# Patient Record
Sex: FEMALE | Race: WHITE | NOT HISPANIC OR LATINO | Employment: FULL TIME | URBAN - NONMETROPOLITAN AREA
[De-identification: names, ages, dates, MRNs, and addresses within clinical notes are randomized per-mention and may not be internally consistent; named-entity substitution may affect disease eponyms.]

---

## 2024-03-15 PROBLEM — Z01.818 PREOP EXAMINATION: Status: ACTIVE | Noted: 2024-03-15

## 2024-03-15 PROBLEM — I10 ESSENTIAL HYPERTENSION: Status: ACTIVE | Noted: 2024-03-15

## 2024-03-15 PROBLEM — M19.90 ARTHRITIS: Status: ACTIVE | Noted: 2024-03-15

## 2024-03-15 PROBLEM — R94.31 ABNORMAL EKG: Status: ACTIVE | Noted: 2024-03-15

## 2024-03-15 PROBLEM — G24.3 CERVICAL DYSTONIA: Status: ACTIVE | Noted: 2024-03-15

## 2024-03-15 PROBLEM — R07.89 CHEST DISCOMFORT: Status: ACTIVE | Noted: 2024-03-15

## 2024-03-15 RX ORDER — TIZANIDINE HYDROCHLORIDE 4 MG/1
4 CAPSULE, GELATIN COATED ORAL EVERY 8 HOURS PRN
COMMUNITY

## 2024-03-15 RX ORDER — LOSARTAN POTASSIUM 50 MG/1
50 TABLET ORAL DAILY
COMMUNITY

## 2024-03-15 RX ORDER — BACLOFEN 10 MG/1
10 TABLET ORAL DAILY
COMMUNITY

## 2024-08-27 ENCOUNTER — APPOINTMENT (OUTPATIENT)
Dept: CARDIOLOGY | Facility: CLINIC | Age: 54
End: 2024-08-27
Payer: COMMERCIAL

## 2024-08-27 VITALS
BODY MASS INDEX: 34.28 KG/M2 | HEIGHT: 60 IN | HEART RATE: 84 BPM | WEIGHT: 174.6 LBS | DIASTOLIC BLOOD PRESSURE: 88 MMHG | SYSTOLIC BLOOD PRESSURE: 139 MMHG

## 2024-08-27 DIAGNOSIS — Z78.9 NEVER SMOKED TOBACCO: ICD-10-CM

## 2024-08-27 DIAGNOSIS — R07.89 CHEST DISCOMFORT: ICD-10-CM

## 2024-08-27 DIAGNOSIS — I10 ESSENTIAL HYPERTENSION: ICD-10-CM

## 2024-08-27 DIAGNOSIS — R94.31 ABNORMAL EKG: Primary | ICD-10-CM

## 2024-08-27 PROCEDURE — 3075F SYST BP GE 130 - 139MM HG: CPT | Performed by: INTERNAL MEDICINE

## 2024-08-27 PROCEDURE — 1036F TOBACCO NON-USER: CPT | Performed by: INTERNAL MEDICINE

## 2024-08-27 PROCEDURE — 99214 OFFICE O/P EST MOD 30 MIN: CPT | Performed by: INTERNAL MEDICINE

## 2024-08-27 PROCEDURE — 3008F BODY MASS INDEX DOCD: CPT | Performed by: INTERNAL MEDICINE

## 2024-08-27 PROCEDURE — 3079F DIAST BP 80-89 MM HG: CPT | Performed by: INTERNAL MEDICINE

## 2024-08-27 RX ORDER — LOSARTAN POTASSIUM 100 MG/1
100 TABLET ORAL DAILY
Qty: 90 TABLET | Refills: 3 | Status: SHIPPED | OUTPATIENT
Start: 2024-08-27 | End: 2025-08-27

## 2024-08-27 ASSESSMENT — ENCOUNTER SYMPTOMS: DIZZINESS: 1

## 2024-08-27 NOTE — PATIENT INSTRUCTIONS
Please bring all medicines, vitamins, and herbal supplements with you when you come to the office.    Prescriptions will not be filled unless you are compliant with your follow up appointments or have a follow up appointment scheduled as per instruction of your physician. Refills should be requested at the time of your visit.     BMI was above normal measurement. Current weight: 79.2 kg (174 lb 9.6 oz)  Weight change since last visit (-) denotes wt loss 17.2 lbs   Weight loss needed to achieve BMI 25: 46.9 Lbs  Weight loss needed to achieve BMI 30: 21.3 Lbs  Provided instructions on dietary changes  Provided instructions on exercise.    Increase losartan  One year

## 2024-08-27 NOTE — LETTER
August 27, 2024     Edward Gotti DO  [address] [place] [address]    Patient: Serena Ramirez   YOB: 1970   Date of Visit: 8/27/2024       Dear Dr. Edward Gotti DO:    Thank you for referring Serena Ramirez to me for evaluation. Below are my notes for this consultation.  If you have questions, please do not hesitate to call me. I look forward to following your patient along with you.       Sincerely,     Myron Ny MD      CC: No Recipients  ______________________________________________________________________________________    Subjective   Serena Ramirez is a 54 y.o. female       Chief Complaint    Annual Exam          HPI         Patient is here for follow-up to management for previous evaluation for abnormal EKG, previous evaluation for chest pain and obesity.  Since last time I saw her she reports functional class I.  She denies complaint of chest pain, palpitation, lightheadedness, dizziness or syncope.  She reports her blood pressure running a little bit on the higher range.  Her last stress test was clinically negative.    Assessment     1. borderline abnormal EKG with felt is related to lead placement.  Stress test was clinically negative  2.  Previous evaluation for chest pain nonspecific and nonexertional.  No recurrence stress test was negative  3. Functional class I without any cardiac symptoms  4. Hypertension running a little bit on the higher range  5.  Social stress due to the illness of her         Plan     1. I reviewed the results of her stress test  2. I discussed with her risk factor modification  3.  I recommended to increase losartan to 100 mg daily and continue to advise to monitor blood pressure and notify us if it remains elevated  4.  I will see her back in the office in 1 year in follow-up  Review of Systems   Cardiovascular:  Positive for chest pain.   Neurological:  Positive for dizziness.   All other systems reviewed and are negative.            Vitals:    08/27/24 1409 08/27/24 1440   BP: (!) 150/96 139/88   BP Location: Right arm    Patient Position: Sitting    Pulse: 84    Weight: 79.2 kg (174 lb 9.6 oz)    Height: 1.524 m (5')         Objective   Physical Exam  Constitutional:       Appearance: Normal appearance.   HENT:      Nose: Nose normal.   Neck:      Vascular: No carotid bruit.   Cardiovascular:      Rate and Rhythm: Normal rate.      Pulses: Normal pulses.      Heart sounds: Normal heart sounds.   Pulmonary:      Effort: Pulmonary effort is normal.   Abdominal:      General: Bowel sounds are normal.      Palpations: Abdomen is soft.   Musculoskeletal:         General: Normal range of motion.      Cervical back: Normal range of motion.      Right lower leg: No edema.      Left lower leg: No edema.   Skin:     General: Skin is warm and dry.   Neurological:      General: No focal deficit present.      Mental Status: She is alert.   Psychiatric:         Mood and Affect: Mood normal.         Behavior: Behavior normal.         Thought Content: Thought content normal.         Judgment: Judgment normal.         Allergies  Patient has no known allergies.     Current Medications    Current Outpatient Medications:   •  baclofen (Lioresal) 10 mg tablet, Take 1 tablet (10 mg) by mouth once daily., Disp: , Rfl:   •  losartan (Cozaar) 100 mg tablet, Take 1 tablet (100 mg) by mouth once daily., Disp: 90 tablet, Rfl: 3                     Assessment/Plan   1. Essential hypertension  Follow Up In Cardiology    losartan (Cozaar) 100 mg tablet    Basic Metabolic Panel    Basic Metabolic Panel      2. BMI 34.0-34.9,adult        3. Never smoked tobacco        4. Abnormal EKG        5. Chest discomfort                 Scribe Attestation  By signing my name below, Lenora VALLECILLO LPN   , Scribe   attest that this documentation has been prepared under the direction and in the presence of Myron Ny MD.     Provider Attestation - Scribe documentation    All  medical record entries made by the Scribe were at my direction and personally dictated by me. I have reviewed the chart and agree that the record accurately reflects my personal performance of the history, physical exam, discussion and plan.

## 2024-08-27 NOTE — PROGRESS NOTES
Subjective   Serena Ramirez is a 54 y.o. female       Chief Complaint    Annual Exam          HPI         Patient is here for follow-up to management for previous evaluation for abnormal EKG, previous evaluation for chest pain and obesity.  Since last time I saw her she reports functional class I.  She denies complaint of chest pain, palpitation, lightheadedness, dizziness or syncope.  She reports her blood pressure running a little bit on the higher range.  Her last stress test was clinically negative.    Assessment     1. borderline abnormal EKG with felt is related to lead placement.  Stress test was clinically negative  2.  Previous evaluation for chest pain nonspecific and nonexertional.  No recurrence stress test was negative  3. Functional class I without any cardiac symptoms  4. Hypertension running a little bit on the higher range  5.  Social stress due to the illness of her         Plan     1. I reviewed the results of her stress test  2. I discussed with her risk factor modification  3.  I recommended to increase losartan to 100 mg daily and continue to advise to monitor blood pressure and notify us if it remains elevated  4.  I will see her back in the office in 1 year in follow-up  Review of Systems   Cardiovascular:  Positive for chest pain.   Neurological:  Positive for dizziness.   All other systems reviewed and are negative.           Vitals:    08/27/24 1409 08/27/24 1440   BP: (!) 150/96 139/88   BP Location: Right arm    Patient Position: Sitting    Pulse: 84    Weight: 79.2 kg (174 lb 9.6 oz)    Height: 1.524 m (5')         Objective   Physical Exam  Constitutional:       Appearance: Normal appearance.   HENT:      Nose: Nose normal.   Neck:      Vascular: No carotid bruit.   Cardiovascular:      Rate and Rhythm: Normal rate.      Pulses: Normal pulses.      Heart sounds: Normal heart sounds.   Pulmonary:      Effort: Pulmonary effort is normal.   Abdominal:      General: Bowel sounds are  normal.      Palpations: Abdomen is soft.   Musculoskeletal:         General: Normal range of motion.      Cervical back: Normal range of motion.      Right lower leg: No edema.      Left lower leg: No edema.   Skin:     General: Skin is warm and dry.   Neurological:      General: No focal deficit present.      Mental Status: She is alert.   Psychiatric:         Mood and Affect: Mood normal.         Behavior: Behavior normal.         Thought Content: Thought content normal.         Judgment: Judgment normal.         Allergies  Patient has no known allergies.     Current Medications    Current Outpatient Medications:     baclofen (Lioresal) 10 mg tablet, Take 1 tablet (10 mg) by mouth once daily., Disp: , Rfl:     losartan (Cozaar) 100 mg tablet, Take 1 tablet (100 mg) by mouth once daily., Disp: 90 tablet, Rfl: 3                     Assessment/Plan   1. Essential hypertension  Follow Up In Cardiology    losartan (Cozaar) 100 mg tablet    Basic Metabolic Panel    Basic Metabolic Panel      2. BMI 34.0-34.9,adult        3. Never smoked tobacco        4. Abnormal EKG        5. Chest discomfort                 Scribe Attestation  By signing my name below, Lenora VALLECILLO LPN, Scribe   attest that this documentation has been prepared under the direction and in the presence of Myron Ny MD.     Provider Attestation - Scribe documentation    All medical record entries made by the Scribe were at my direction and personally dictated by me. I have reviewed the chart and agree that the record accurately reflects my personal performance of the history, physical exam, discussion and plan.

## 2024-09-17 LAB
NON-UH HIE ANION GAP: 11.2 (ref 6–15)
NON-UH HIE BLOOD UREA NITROGEN: 17 MG/DL (ref 7–25)
NON-UH HIE CALCIUM: 9.6 MG/DL (ref 8.6–10.3)
NON-UH HIE CARBON DIOXIDE: 29.6 MMOL/L (ref 21–31)
NON-UH HIE CHLORIDE: 105 MMOL/L (ref 98–107)
NON-UH HIE CREATININE: 0.56 MG/DL (ref 0.6–1.2)
NON-UH HIE ESTIMATED GFR: > 60
NON-UH HIE GLUCOSE: 88 MG/DL (ref 70–100)
NON-UH HIE POTASSIUM: 3.8 MMOL/L (ref 3.5–5.1)
NON-UH HIE SODIUM: 142 MMOL/L (ref 136–145)

## 2025-08-25 ENCOUNTER — APPOINTMENT (OUTPATIENT)
Dept: CARDIOLOGY | Facility: CLINIC | Age: 55
End: 2025-08-25
Payer: COMMERCIAL

## 2025-09-05 DIAGNOSIS — I10 ESSENTIAL HYPERTENSION: ICD-10-CM

## 2025-09-05 RX ORDER — LOSARTAN POTASSIUM 100 MG/1
100 TABLET ORAL DAILY
Qty: 90 TABLET | Refills: 3 | Status: SHIPPED | OUTPATIENT
Start: 2025-09-05 | End: 2026-09-05

## 2025-10-06 ENCOUNTER — APPOINTMENT (OUTPATIENT)
Dept: CARDIOLOGY | Facility: CLINIC | Age: 55
End: 2025-10-06
Payer: COMMERCIAL